# Patient Record
(demographics unavailable — no encounter records)

---

## 2025-01-13 NOTE — PHYSICAL EXAM
[Chaperone Present] : A chaperone was present in the examining room during all aspects of the physical examination [53165] : A chaperone was present during the pelvic exam. [Appropriately responsive] : appropriately responsive [Alert] : alert [No Acute Distress] : no acute distress [Soft] : soft [Non-tender] : non-tender [Non-distended] : non-distended [Oriented x3] : oriented x3 [Examination Of The Breasts] : a normal appearance [No Masses] : no breast masses were palpable [Vulvar Atrophy] : vulvar atrophy [Labia Majora] : normal [Labia Minora] : normal [Atrophy] : atrophy [Normal] : normal [Uterine Adnexae] : normal [FreeTextEntry2] : Lucille [FreeTextEntry6] : limited secondary torecent abdominoplasty

## 2025-01-13 NOTE — HISTORY OF PRESENT ILLNESS
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

## 2025-04-25 NOTE — PROCEDURE
[Hysteroscopy] : Hysteroscopy [Removal of polyp] : removal of polyp [Risks] : risks [Benefits] : benefits [Alternatives] : alternatives [Lidocaine w/ Epi ___mL] : ~VmL of lidocaine with epinephrine [Sent to Pathology] : specimen was placed in buffered formalin and sent for pathology [Hemostasis obtained] : hemostasis obtained [de-identified] :  After informed consent patient prepped in the usual fashion.  A sterile speculum was placed inside the vagina and the vagina and cervix was prepped with betadine.   An Allis was placed on the anterior lip of the cervix.  ECC was taken with a Kevorkian curette. The aveta was then placed inside the uterine cavity with the following findings. Scar tissue near b/l ostia R>L no discrete polyp or other pathology noted. The aveta was then removed. Endometrial biopsy was then taken.  All instruments were then removed from the vagina. Good hemostasis was noted.